# Patient Record
Sex: FEMALE | Race: ASIAN | NOT HISPANIC OR LATINO
[De-identification: names, ages, dates, MRNs, and addresses within clinical notes are randomized per-mention and may not be internally consistent; named-entity substitution may affect disease eponyms.]

---

## 2017-05-22 ENCOUNTER — APPOINTMENT (OUTPATIENT)
Dept: SPINE | Facility: CLINIC | Age: 72
End: 2017-05-22

## 2017-05-22 VITALS
SYSTOLIC BLOOD PRESSURE: 126 MMHG | DIASTOLIC BLOOD PRESSURE: 78 MMHG | HEIGHT: 62 IN | HEART RATE: 72 BPM | WEIGHT: 118 LBS | BODY MASS INDEX: 21.71 KG/M2

## 2017-05-22 DIAGNOSIS — S12.130G: ICD-10-CM

## 2017-09-11 ENCOUNTER — CLINICAL ADVICE (OUTPATIENT)
Age: 72
End: 2017-09-11

## 2017-09-12 ENCOUNTER — OTHER (OUTPATIENT)
Age: 72
End: 2017-09-12

## 2022-10-20 ENCOUNTER — APPOINTMENT (OUTPATIENT)
Dept: PULMONOLOGY | Facility: CLINIC | Age: 77
End: 2022-10-20

## 2022-10-21 ENCOUNTER — APPOINTMENT (OUTPATIENT)
Dept: PULMONOLOGY | Facility: CLINIC | Age: 77
End: 2022-10-21

## 2022-10-21 VITALS
SYSTOLIC BLOOD PRESSURE: 120 MMHG | HEART RATE: 60 BPM | WEIGHT: 116 LBS | BODY MASS INDEX: 21.9 KG/M2 | OXYGEN SATURATION: 99 % | TEMPERATURE: 96.6 F | DIASTOLIC BLOOD PRESSURE: 76 MMHG | HEIGHT: 61 IN

## 2022-10-21 DIAGNOSIS — R06.02 SHORTNESS OF BREATH: ICD-10-CM

## 2022-10-21 PROCEDURE — 99204 OFFICE O/P NEW MOD 45 MIN: CPT

## 2022-10-21 NOTE — REVIEW OF SYSTEMS
[Fever] : no fever [Fatigue] : fatigue [Chills] : no chills [Dyspnea] : dyspnea [SOB on Exertion] : sob on exertion [Myalgias] : no myalgias [Anemia] : no anemia [Headache] : no headache [Anxiety] : no anxiety [Thyroid Problem] : thyroid problem

## 2022-10-21 NOTE — DISCUSSION/SUMMARY
[FreeTextEntry1] : She is a 77 year old woman with a history of hypertension, hyperlipidemia, hypothyroidism, coronary artery disease, cardiac arrhythmia, S.P ICD who presented with shortness of breath on exertion. \par \par She also feels tired. Has been on Symbicort and Spiriva along with albuterol as needed. The albuterol provides her with some relief. \par \par Her ELLISON is most likely multifactorial however COPD needs to be considered. A PFT will be obtained. A chest x-ray was requested. \par \par Further recommendations to follow the results of the above.

## 2022-10-21 NOTE — PHYSICAL EXAM
[No Acute Distress] : no acute distress [Normal Oropharynx] : normal oropharynx [No Neck Mass] : no neck mass [Normal S1, S2] : normal s1, s2 [No Resp Distress] : no resp distress [Clear to Auscultation Bilaterally] : clear to auscultation bilaterally [No Abnormalities] : no abnormalities [No HSM] : no hsm [No Cyanosis] : no cyanosis [No Edema] : no edema [Normal Turgor] : normal turgor [No Focal Deficits] : no focal deficits [Oriented x3] : oriented x3 [Normal Affect] : normal affect

## 2022-10-21 NOTE — HISTORY OF PRESENT ILLNESS
[TextBox_4] : She is a 77 year old woman with a history of hypertension, hyperlipidemia, hypothyroidism, coronary artery disease, cardiac arrhythmia, S.P ICD who presented with shortness of breath on exertion. \par \par She also feels tired. Has been on Symbicort and Spiriva along with albuterol as needed. The albuterol provides her with some relief.  [Difficulty Initiating Sleep] : does not have difficulty initiating sleep [Difficulty Maintaining Sleep] : does not have difficulty maintaining sleep

## 2022-11-03 ENCOUNTER — APPOINTMENT (OUTPATIENT)
Dept: PULMONOLOGY | Facility: CLINIC | Age: 77
End: 2022-11-03

## 2022-11-03 VITALS
DIASTOLIC BLOOD PRESSURE: 74 MMHG | WEIGHT: 118 LBS | OXYGEN SATURATION: 98 % | BODY MASS INDEX: 22.3 KG/M2 | HEART RATE: 64 BPM | TEMPERATURE: 97.3 F | SYSTOLIC BLOOD PRESSURE: 132 MMHG

## 2022-11-03 PROCEDURE — 94060 EVALUATION OF WHEEZING: CPT

## 2022-11-03 PROCEDURE — 99214 OFFICE O/P EST MOD 30 MIN: CPT | Mod: 25

## 2022-11-03 PROCEDURE — 94729 DIFFUSING CAPACITY: CPT

## 2022-11-03 PROCEDURE — 94727 GAS DIL/WSHOT DETER LNG VOL: CPT

## 2022-11-03 NOTE — REVIEW OF SYSTEMS
[Fatigue] : fatigue [SOB on Exertion] : sob on exertion [Thyroid Problem] : thyroid problem [Fever] : no fever [Chest Tightness] : no chest tightness [Chest Discomfort] : no chest discomfort [Palpitations] : no palpitations [Myalgias] : no myalgias [Anemia] : no anemia [Headache] : no headache [Anxiety] : no anxiety

## 2022-11-03 NOTE — PROCEDURE
[FreeTextEntry1] : Chest x-ray 10/29/22: Bilateral pleural effusions unchanged when compared to the prior examination.  Mild cardiomegaly.  Defibrillator in place.\par \par PFT 11/3/22: Spirometry was normal. Lung volumes were normal. Mild reduction in diffusion. No significant improvement after bronchodilator.

## 2022-11-03 NOTE — PHYSICAL EXAM
[No Acute Distress] : no acute distress [No Neck Mass] : no neck mass [Normal S1, S2] : normal s1, s2 [No Resp Distress] : no resp distress [Clear to Auscultation Bilaterally] : clear to auscultation bilaterally [No Abnormalities] : no abnormalities [No HSM] : no hsm [No Cyanosis] : no cyanosis [No Edema] : no edema [Normal Turgor] : normal turgor [No Focal Deficits] : no focal deficits [Oriented x3] : oriented x3 [Normal Affect] : normal affect [Normal Rate/Rhythm] : normal rate/rhythm

## 2022-11-03 NOTE — HISTORY OF PRESENT ILLNESS
[Never] : never [TextBox_4] : She is a 77 year old woman with a history of hypertension, hyperlipidemia, hypothyroidism, coronary artery disease, cardiac arrhythmia, S.P ICD who presented with shortness of breath on exertion. \par \par She also feels tired. Has been on Symbicort and Spiriva along with albuterol as needed. The albuterol provides her with some relief. \par \par She came for follow-up.\par  [Difficulty Initiating Sleep] : does not have difficulty initiating sleep [Difficulty Maintaining Sleep] : does not have difficulty maintaining sleep

## 2022-11-03 NOTE — DISCUSSION/SUMMARY
[FreeTextEntry1] : She is a 77 year old woman with a history of hypertension, hyperlipidemia, hypothyroidism, coronary artery disease, cardiac arrhythmia, S/P ICD who presented with shortness of breath on exertion. \par \par Her pulmonary function is nearly normal.  Her chest x-ray showed bilateral pleural effusions which was stable.  She does not appear to have any chronic lung disease. She was advised to stop the Spiriva and continue with Symbicort and albuterol as needed. \par \par Follow up with Dr. Babin.\par \par I would like to see her again in 3 months.  I will see her sooner than that if necessary.

## 2023-02-02 ENCOUNTER — APPOINTMENT (OUTPATIENT)
Dept: PULMONOLOGY | Facility: CLINIC | Age: 78
End: 2023-02-02

## 2023-03-16 ENCOUNTER — APPOINTMENT (OUTPATIENT)
Dept: PULMONOLOGY | Facility: CLINIC | Age: 78
End: 2023-03-16
Payer: MEDICARE

## 2023-03-16 VITALS
OXYGEN SATURATION: 99 % | TEMPERATURE: 96 F | SYSTOLIC BLOOD PRESSURE: 124 MMHG | DIASTOLIC BLOOD PRESSURE: 68 MMHG | WEIGHT: 115 LBS | BODY MASS INDEX: 21.73 KG/M2 | HEART RATE: 60 BPM

## 2023-03-16 DIAGNOSIS — J90 PLEURAL EFFUSION, NOT ELSEWHERE CLASSIFIED: ICD-10-CM

## 2023-03-16 PROCEDURE — 99213 OFFICE O/P EST LOW 20 MIN: CPT

## 2023-03-16 NOTE — REVIEW OF SYSTEMS
[Fatigue] : fatigue [Thyroid Problem] : thyroid problem [Fever] : no fever [Cough] : no cough [Nasal Congestion] : no nasal congestion [Chest Tightness] : no chest tightness [Sputum] : no sputum [Dyspnea] : no dyspnea [Pleuritic Pain] : no pleuritic pain [Wheezing] : no wheezing [SOB on Exertion] : no sob on exertion [Chest Discomfort] : no chest discomfort [Palpitations] : no palpitations [Myalgias] : no myalgias [Anemia] : no anemia [Headache] : no headache [Anxiety] : no anxiety

## 2023-03-16 NOTE — PHYSICAL EXAM
[No Acute Distress] : no acute distress [No Neck Mass] : no neck mass [Normal Rate/Rhythm] : normal rate/rhythm [Normal S1, S2] : normal s1, s2 [No Resp Distress] : no resp distress [Clear to Auscultation Bilaterally] : clear to auscultation bilaterally [No Abnormalities] : no abnormalities [No HSM] : no hsm [No Cyanosis] : no cyanosis [No Edema] : no edema [Normal Turgor] : normal turgor [No Focal Deficits] : no focal deficits [Oriented x3] : oriented x3 [Normal Affect] : normal affect [Normal Appearance] : normal appearance

## 2023-03-16 NOTE — DISCUSSION/SUMMARY
[FreeTextEntry1] : She is a 77 year old woman with a history of hypertension, hyperlipidemia, hypothyroidism, coronary artery disease, cardiac arrhythmia, S/P ICD who presented with shortness of breath on exertion. She never smoked.\par \par Was having neck pain. CT was done. Pleural effusions were noted. \par \par A chest x-ray was requested. \par \par Further recommendations to follow the results of the above. \par

## 2023-03-16 NOTE — HISTORY OF PRESENT ILLNESS
[TextBox_4] : She is a 77 year old woman with a history of hypertension, hyperlipidemia, hypothyroidism, coronary artery disease, cardiac arrhythmia, S.P ICD who presented with shortness of breath on exertion. She never smoked. \par \par Was having neck pain. CT was done. Pleural effusions were noted.  [Awakes Unrefreshed] : does not awaken unrefreshed [Difficulty Initiating Sleep] : does not have difficulty initiating sleep [Difficulty Maintaining Sleep] : does not have difficulty maintaining sleep

## 2023-04-24 ENCOUNTER — APPOINTMENT (OUTPATIENT)
Dept: PULMONOLOGY | Facility: CLINIC | Age: 78
End: 2023-04-24
Payer: MEDICARE

## 2023-04-24 VITALS
SYSTOLIC BLOOD PRESSURE: 128 MMHG | DIASTOLIC BLOOD PRESSURE: 64 MMHG | WEIGHT: 115 LBS | OXYGEN SATURATION: 99 % | BODY MASS INDEX: 21.73 KG/M2 | HEART RATE: 70 BPM | TEMPERATURE: 96.6 F

## 2023-04-24 DIAGNOSIS — R06.02 SHORTNESS OF BREATH: ICD-10-CM

## 2023-04-24 PROCEDURE — 99214 OFFICE O/P EST MOD 30 MIN: CPT

## 2023-04-24 RX ORDER — SIMVASTATIN 20 MG/1
20 TABLET, FILM COATED ORAL
Refills: 0 | Status: ACTIVE | COMMUNITY

## 2023-04-24 RX ORDER — METOPROLOL TARTRATE 50 MG/1
50 TABLET, FILM COATED ORAL
Refills: 0 | Status: ACTIVE | COMMUNITY

## 2023-04-24 RX ORDER — FUROSEMIDE 20 MG/1
20 TABLET ORAL
Refills: 0 | Status: ACTIVE | COMMUNITY

## 2023-04-24 RX ORDER — ALBUTEROL SULFATE 90 UG/1
108 (90 BASE) INHALANT RESPIRATORY (INHALATION)
Qty: 1 | Refills: 2 | Status: ACTIVE | COMMUNITY
Start: 1900-01-01 | End: 1900-01-01

## 2023-04-24 RX ORDER — LEVOTHYROXINE SODIUM 0.07 MG/1
75 TABLET ORAL
Qty: 90 | Refills: 0 | Status: COMPLETED | COMMUNITY
Start: 2017-01-19 | End: 2023-04-24

## 2023-04-24 RX ORDER — BUDESONIDE AND FORMOTEROL FUMARATE DIHYDRATE 160; 4.5 UG/1; UG/1
160-4.5 AEROSOL RESPIRATORY (INHALATION)
Refills: 0 | Status: COMPLETED | COMMUNITY
End: 2023-04-24

## 2023-04-24 RX ORDER — APIXABAN 5 MG/1
5 TABLET, FILM COATED ORAL
Refills: 0 | Status: ACTIVE | COMMUNITY

## 2023-04-24 RX ORDER — TIOTROPIUM BROMIDE 18 UG/1
18 CAPSULE ORAL; RESPIRATORY (INHALATION)
Qty: 30 | Refills: 0 | Status: COMPLETED | COMMUNITY
Start: 2017-04-24 | End: 2023-04-24

## 2023-04-24 RX ORDER — LEVOTHYROXINE SODIUM 50 MCG
50 TABLET ORAL
Refills: 0 | Status: ACTIVE | COMMUNITY

## 2023-04-24 NOTE — REVIEW OF SYSTEMS
[Fatigue] : fatigue [Thyroid Problem] : thyroid problem [SOB on Exertion] : sob on exertion [Fever] : no fever [Nasal Congestion] : no nasal congestion [Cough] : no cough [Hemoptysis] : no hemoptysis [Sputum] : no sputum [Wheezing] : no wheezing [Chest Discomfort] : no chest discomfort [Palpitations] : no palpitations [Myalgias] : no myalgias [Anemia] : no anemia [Headache] : no headache [Anxiety] : no anxiety

## 2023-04-24 NOTE — DISCUSSION/SUMMARY
[FreeTextEntry1] : She is a 77 year old woman with a history of hypertension, hyperlipidemia, hypothyroidism, coronary artery disease, cardiac arrhythmia, S/P ICD who presented with shortness of breath on exertion. She never smoked.\par \par Impression: \par The shortness of breath on exertion is most likely multifactorial\par - she has significant cardiac disease\par - spirometry was normal\par \par Plan: \par A CT of the chest will be obtained.\par Further recommendations to follow the results of the above\par I will speak to cardiology regarding her cardiac status\par She was advised to go to the hospital if her shortness of breath gets any worse

## 2023-04-24 NOTE — HISTORY OF PRESENT ILLNESS
[Never] : never [TextBox_4] : She is a 77 year old woman with a history of hypertension, hyperlipidemia, hypothyroidism, coronary artery disease, cardiac arrhythmia, S.P ICD who presented with shortness of breath on exertion. She never smoked. \par \par She continues to complain of shortness of breath on exertion.  No chest pain, pressure or palpitations.  Has had some leg edema. [Awakes Unrefreshed] : does not awaken unrefreshed [Difficulty Initiating Sleep] : does not have difficulty initiating sleep [Difficulty Maintaining Sleep] : does not have difficulty maintaining sleep

## 2023-04-24 NOTE — PHYSICAL EXAM
[No Acute Distress] : no acute distress [Normal Appearance] : normal appearance [No Neck Mass] : no neck mass [Normal S1, S2] : normal s1, s2 [No Resp Distress] : no resp distress [No Abnormalities] : no abnormalities [No HSM] : no hsm [No Cyanosis] : no cyanosis [Normal Turgor] : normal turgor [No Focal Deficits] : no focal deficits [Oriented x3] : oriented x3 [Normal Affect] : normal affect [Rales] : rales [1+ Pitting] : 1+ pitting [TextBox_54] : Heart sounds were irregular [TextBox_68] : Mild crackles were noted at both bases

## 2023-04-24 NOTE — PROCEDURE
[FreeTextEntry1] : Chest x-ray 10/29/22: Bilateral pleural effusions unchanged when compared to the prior examination.  Mild cardiomegaly.  Defibrillator in place.\par \par Chest x-ray 4/15/23: Small bilateral pleural effusions, similar to the prior study of 10/29/2022.\par \par PFT 11/3/22: Spirometry was normal. Lung volumes were normal. Mild reduction in diffusion. No significant improvement after bronchodilator.

## 2023-04-25 ENCOUNTER — APPOINTMENT (OUTPATIENT)
Dept: GASTROENTEROLOGY | Facility: CLINIC | Age: 78
End: 2023-04-25

## 2023-05-05 ENCOUNTER — NON-APPOINTMENT (OUTPATIENT)
Age: 78
End: 2023-05-05